# Patient Record
Sex: MALE | Race: WHITE | NOT HISPANIC OR LATINO
[De-identification: names, ages, dates, MRNs, and addresses within clinical notes are randomized per-mention and may not be internally consistent; named-entity substitution may affect disease eponyms.]

---

## 2019-09-13 ENCOUNTER — TRANSCRIPTION ENCOUNTER (OUTPATIENT)
Age: 64
End: 2019-09-13

## 2020-07-06 ENCOUNTER — TRANSCRIPTION ENCOUNTER (OUTPATIENT)
Age: 65
End: 2020-07-06

## 2020-11-03 ENCOUNTER — TRANSCRIPTION ENCOUNTER (OUTPATIENT)
Age: 65
End: 2020-11-03

## 2020-11-12 ENCOUNTER — TRANSCRIPTION ENCOUNTER (OUTPATIENT)
Age: 65
End: 2020-11-12

## 2021-10-10 ENCOUNTER — TRANSCRIPTION ENCOUNTER (OUTPATIENT)
Age: 66
End: 2021-10-10

## 2023-04-10 ENCOUNTER — APPOINTMENT (OUTPATIENT)
Dept: ORTHOPEDIC SURGERY | Facility: CLINIC | Age: 68
End: 2023-04-10
Payer: COMMERCIAL

## 2023-04-10 DIAGNOSIS — E78.00 PURE HYPERCHOLESTEROLEMIA, UNSPECIFIED: ICD-10-CM

## 2023-04-10 DIAGNOSIS — M25.562 PAIN IN LEFT KNEE: ICD-10-CM

## 2023-04-10 DIAGNOSIS — M70.62 TROCHANTERIC BURSITIS, LEFT HIP: ICD-10-CM

## 2023-04-10 DIAGNOSIS — Z78.9 OTHER SPECIFIED HEALTH STATUS: ICD-10-CM

## 2023-04-10 PROCEDURE — 73562 X-RAY EXAM OF KNEE 3: CPT | Mod: LT

## 2023-04-10 PROCEDURE — 73502 X-RAY EXAM HIP UNI 2-3 VIEWS: CPT

## 2023-04-10 PROCEDURE — 99204 OFFICE O/P NEW MOD 45 MIN: CPT

## 2023-04-10 NOTE — DISCUSSION/SUMMARY
[de-identified] :  Lengthy discussion regarding options was had with the patient. Nonsurgical options including but not limited to cortisone,viscosupplementation, anti-inflammatory medications, activity modification,  non impact exercise /maintaining a healthy BMI, bracing, and icing were reviewed. Surgical options including but not limited to arthroscopy, and were discussed as was risks, benefits and alternatives. All questions were answered. \par \par Pain decreasing ROM Cannot get Leg Straight  Inhibiting his Work and ADL's \par MRI Ordered for eval of Meniscus tear \par \par Stretches for GT Bursitis

## 2023-04-10 NOTE — HISTORY OF PRESENT ILLNESS
[de-identified] : Date of Injury/Onset:      3/28/23  twisted left knee and heard pop, now left hip is painful\par Pain: At Rest: 2 /10   \par With Activity:4-6  /10 \par Affecting Sleep:\par Difficulty with stairs:y\par Difficulty getting in and out of car:y\par Sit to stand stiffness:y\par Mechanism of injury:  twisting\par This is not a Work Related Injury being treated under Worker's Compensation.\par This iis not   an athletic injury occurring associated with an interscholastic or organized sports team.\par Quality of symptoms: c/o pain \par Improves with:  nothing  \par Worse with:    walking/exercise\par Previous Treatment/Imaging/Studies Since Last Visit: ice, heat, ace bandage, advil \par Reports Available For Review Today: none\par \par \par  \par \par

## 2023-04-10 NOTE — PHYSICAL EXAM
[Positive] : positive Deven [5___] : adduction 5[unfilled]/5 [Left] : left knee [AP] : anteroposterior [Lateral] : lateral [[place]] : skyline [de-identified] : Due to Flex Knee  [] : no deformities of patellar tendon [FreeTextEntry9] : Joint space narrowing still more than 50% open  [TWNoteComboBox7] : flexion 120 degrees [de-identified] : extension 10 degrees

## 2023-04-20 ENCOUNTER — FORM ENCOUNTER (OUTPATIENT)
Age: 68
End: 2023-04-20

## 2023-04-21 ENCOUNTER — APPOINTMENT (OUTPATIENT)
Dept: MRI IMAGING | Facility: CLINIC | Age: 68
End: 2023-04-21
Payer: COMMERCIAL

## 2023-04-21 ENCOUNTER — RESULT REVIEW (OUTPATIENT)
Age: 68
End: 2023-04-21

## 2023-04-21 PROCEDURE — 73721 MRI JNT OF LWR EXTRE W/O DYE: CPT | Mod: LT

## 2023-05-09 ENCOUNTER — APPOINTMENT (OUTPATIENT)
Dept: ORTHOPEDIC SURGERY | Facility: CLINIC | Age: 68
End: 2023-05-09
Payer: COMMERCIAL

## 2023-05-09 DIAGNOSIS — S83.242A OTHER TEAR OF MEDIAL MENISCUS, CURRENT INJURY, LEFT KNEE, INITIAL ENCOUNTER: ICD-10-CM

## 2023-05-09 PROCEDURE — 99214 OFFICE O/P EST MOD 30 MIN: CPT

## 2023-05-09 NOTE — DISCUSSION/SUMMARY
[de-identified] : Patient reports he is feeling better since previous visit \par Reviewed MRI images and report of left knee with the pt which revealed a meniscal tear\par Recommend strengthening of thigh\par Recommend stretching out hip musculature \par f/u prn

## 2023-05-09 NOTE — PHYSICAL EXAM
[5___] : hamstring 5[unfilled]/5 [Positive] : positive Deven [Left] : left knee [AP] : anteroposterior [Lateral] : lateral [[place]] : skyline [] : no deformities of patellar tendon [FreeTextEntry8] : minimal MJLT [de-identified] : minimally positive mcmurrays medially [FreeTextEntry9] : Joint space narrowing still more than 50% open  [TWNoteComboBox7] : flexion 130 degrees [de-identified] : extension 0 degrees

## 2023-05-09 NOTE — DATA REVIEWED
[MRI] : MRI [Left] : left [Knee] : knee [Report was reviewed and noted in the chart] : The report was reviewed and noted in the chart [FreeTextEntry1] : Grade 2 MCL injury, as above.\par Medial and patellofemoral compartment osteoarthritis, as above.

## 2023-05-09 NOTE — REASON FOR VISIT
[FreeTextEntry2] : here for mri results left knee.  feeling better.  wearing brace that was suggested and using advil.

## 2023-11-07 ENCOUNTER — OFFICE (OUTPATIENT)
Facility: LOCATION | Age: 68
Setting detail: OPHTHALMOLOGY
End: 2023-11-07
Payer: COMMERCIAL

## 2023-11-07 ENCOUNTER — RX ONLY (RX ONLY)
Age: 68
End: 2023-11-07

## 2023-11-07 DIAGNOSIS — H16.221: ICD-10-CM

## 2023-11-07 DIAGNOSIS — Z96.1: ICD-10-CM

## 2023-11-07 DIAGNOSIS — H16.222: ICD-10-CM

## 2023-11-07 PROBLEM — H16.223 DRY EYE SYNDROME K SICCA; BOTH EYES: Status: ACTIVE | Noted: 2023-11-07

## 2023-11-07 PROCEDURE — 99213 OFFICE O/P EST LOW 20 MIN: CPT | Mod: 25 | Performed by: OPHTHALMOLOGY

## 2023-11-07 PROCEDURE — 68761 CLOSE TEAR DUCT OPENING: CPT | Mod: LT | Performed by: OPHTHALMOLOGY

## 2023-11-07 PROCEDURE — 68761 CLOSE TEAR DUCT OPENING: CPT | Mod: RT | Performed by: OPHTHALMOLOGY

## 2023-11-07 ASSESSMENT — SUPERFICIAL PUNCTATE KERATITIS (SPK)
OD_SPK: T
OS_SPK: T

## 2023-11-07 ASSESSMENT — LID EXAM ASSESSMENTS
OD_BLEPHARITIS: T
OS_BLEPHARITIS: T

## 2023-11-07 ASSESSMENT — CONFRONTATIONAL VISUAL FIELD TEST (CVF)
OS_FINDINGS: FULL
OD_FINDINGS: FULL

## 2023-11-08 PROBLEM — H01.00 BLEPHARITIS ; BOTH EYES: Status: ACTIVE | Noted: 2023-11-07

## 2023-11-08 PROBLEM — Z96.1 PSEUDOPHAKIA ; BOTH EYES: Status: ACTIVE | Noted: 2023-11-07

## 2023-11-08 ASSESSMENT — REFRACTION_CURRENTRX
OD_SPHERE: +0.50
OS_CYLINDER: SPH
OS_SPHERE: +1.00
OS_OVR_VA: 20/
OS_ADD: +2.25
OD_CYLINDER: +0.75
OD_OVR_VA: 20/
OD_ADD: +2.25
OD_AXIS: 65

## 2024-08-28 ENCOUNTER — APPOINTMENT (OUTPATIENT)
Dept: ORTHOPEDIC SURGERY | Facility: CLINIC | Age: 69
End: 2024-08-28
Payer: COMMERCIAL

## 2024-08-28 DIAGNOSIS — M75.111 INCOMPLETE ROTATOR CUFF TEAR OR RUPTURE OF RIGHT SHOULDER, NOT SPECIFIED AS TRAUMATIC: ICD-10-CM

## 2024-08-28 DIAGNOSIS — M25.811 OTHER SPECIFIED JOINT DISORDERS, RIGHT SHOULDER: ICD-10-CM

## 2024-08-28 PROCEDURE — 73030 X-RAY EXAM OF SHOULDER: CPT | Mod: RT

## 2024-08-28 PROCEDURE — 99214 OFFICE O/P EST MOD 30 MIN: CPT

## 2024-08-28 RX ORDER — CELECOXIB 200 MG/1
200 CAPSULE ORAL
Qty: 30 | Refills: 1 | Status: ACTIVE | COMMUNITY
Start: 2024-08-28 | End: 1900-01-01

## 2024-08-28 NOTE — HISTORY OF PRESENT ILLNESS
[de-identified] : Patient denies any injury. He reports throbbing intermittent pain in the shoulder with certain ROM. He takes Advil for the pain and has been to a .

## 2024-08-28 NOTE — DISCUSSION/SUMMARY
[de-identified] : We discussed MRI to eval for rtc tear, formal PT, a home exercise program, ice therapy and the role of NSAIDS (patient declines) for the pain. These modalities will improve the patient's functionality in their activities of daily life.  Risks, benefits and contraindications were discussed.  The patient will follow up after MRI or sooner as needed.

## 2024-08-28 NOTE — PHYSICAL EXAM
[Normal Coordination] : normal coordination [Normal Sensation] : normal sensation [Normal Mood and Affect] : normal mood and affect [Oriented] : oriented [Able to Communicate] : able to communicate [Well Developed] : well developed [Well Nourished] : well nourished [4 ___] : forward flexion 4[unfilled]/5 [4___] : abduction 4[unfilled]/5 [5___] : internal rotation 5[unfilled]/5 [Right] : right shoulder [There are no fractures, subluxations or dislocations. No significant abnormalities are seen] : There are no fractures, subluxations or dislocations. No significant abnormalities are seen [] : negative Rao [TWNoteComboBox7] : active forward flexion 160 degrees [de-identified] : active abduction 160 degrees

## 2024-11-27 ENCOUNTER — APPOINTMENT (OUTPATIENT)
Dept: PULMONOLOGY | Facility: CLINIC | Age: 69
End: 2024-11-27
Payer: COMMERCIAL

## 2024-11-27 PROCEDURE — 94010 BREATHING CAPACITY TEST: CPT | Mod: TC

## 2024-11-27 PROCEDURE — 94729 DIFFUSING CAPACITY: CPT | Mod: TC

## 2024-11-27 PROCEDURE — 94727 GAS DIL/WSHOT DETER LNG VOL: CPT | Mod: TC

## 2025-01-06 ENCOUNTER — NON-APPOINTMENT (OUTPATIENT)
Age: 70
End: 2025-01-06

## 2025-03-18 ENCOUNTER — NON-APPOINTMENT (OUTPATIENT)
Age: 70
End: 2025-03-18

## 2025-04-29 ENCOUNTER — APPOINTMENT (OUTPATIENT)
Dept: INTERNAL MEDICINE | Facility: CLINIC | Age: 70
End: 2025-04-29